# Patient Record
(demographics unavailable — no encounter records)

---

## 2025-02-16 NOTE — PHYSICAL EXAM
[General Appearance - Well Developed] : well developed [Skin Color & Pigmentation] : normal skin color and pigmentation [Edema] : no peripheral edema [] : no respiratory distress [Oriented To Time, Place, And Person] : oriented to person, place, and time [Normal Station and Gait] : the gait and station were normal for the patient's age

## 2025-02-19 NOTE — ASSESSMENT
[FreeTextEntry1] : Doing well PSA today f/u 12 months  Uroflow and PVR next visit PSA next visit  Trial of tadalafil 10mg side effects discussed

## 2025-02-19 NOTE — HISTORY OF PRESENT ILLNESS
[None] : None [FreeTextEntry1] : 71y/o man History of cystolitholapaxy and ThuLEP 7/31/21 Pathology:  125 grams benign  very happy with the results of surgery no incontinence  ED-- tried sildenafil but had side effects   Uroflow peak flow: 8.3 cc/sec voided vol 43  PVR 2cc  PSA 0.60 Feb 2024 0.59 Feb 2023 0.51 Feb 2022 0.21 Sep 2021 11.0 Jun 2020  current meds